# Patient Record
Sex: FEMALE | Race: WHITE | NOT HISPANIC OR LATINO | URBAN - METROPOLITAN AREA
[De-identification: names, ages, dates, MRNs, and addresses within clinical notes are randomized per-mention and may not be internally consistent; named-entity substitution may affect disease eponyms.]

---

## 2021-07-20 ENCOUNTER — OUTPATIENT (OUTPATIENT)
Dept: OUTPATIENT SERVICES | Facility: HOSPITAL | Age: 81
LOS: 1 days | End: 2021-07-20

## 2022-02-11 ENCOUNTER — TRANSCRIPTION ENCOUNTER (OUTPATIENT)
Age: 82
End: 2022-02-11

## 2022-06-23 ENCOUNTER — EMERGENCY (EMERGENCY)
Facility: HOSPITAL | Age: 82
LOS: 1 days | Discharge: HOME | End: 2022-06-23
Admitting: EMERGENCY MEDICINE
Payer: MEDICARE
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
## 2022-06-23 DIAGNOSIS — M54.50 LOW BACK PAIN, UNSPECIFIED: ICD-10-CM
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
## 2022-06-23 DIAGNOSIS — I10 ESSENTIAL (PRIMARY) HYPERTENSION: ICD-10-CM

## 2022-06-23 PROCEDURE — 72192 CT PELVIS W/O DYE: CPT | Mod: 26,MA

## 2022-06-23 PROCEDURE — 99284 EMERGENCY DEPT VISIT MOD MDM: CPT | Mod: FS

## 2022-06-23 PROCEDURE — 72131 CT LUMBAR SPINE W/O DYE: CPT | Mod: 26,MA

## 2022-07-19 ENCOUNTER — APPOINTMENT (OUTPATIENT)
Dept: RADIOLOGY | Facility: CLINIC | Age: 82
End: 2022-07-19

## 2022-07-19 PROCEDURE — 71046 X-RAY EXAM CHEST 2 VIEWS: CPT

## 2023-01-23 ENCOUNTER — APPOINTMENT (OUTPATIENT)
Dept: ULTRASOUND IMAGING | Facility: CLINIC | Age: 83
End: 2023-01-23
Payer: MEDICARE

## 2023-01-23 ENCOUNTER — APPOINTMENT (OUTPATIENT)
Dept: ULTRASOUND IMAGING | Facility: CLINIC | Age: 83
End: 2023-01-23

## 2023-01-23 PROBLEM — Z00.00 ENCOUNTER FOR PREVENTIVE HEALTH EXAMINATION: Status: ACTIVE | Noted: 2023-01-23

## 2023-01-23 PROCEDURE — 76700 US EXAM ABDOM COMPLETE: CPT

## 2023-01-31 ENCOUNTER — TRANSCRIPTION ENCOUNTER (OUTPATIENT)
Age: 83
End: 2023-01-31

## 2023-01-31 ENCOUNTER — APPOINTMENT (OUTPATIENT)
Dept: CT IMAGING | Facility: CLINIC | Age: 83
End: 2023-01-31
Payer: MEDICARE

## 2023-01-31 PROCEDURE — 74178 CT ABD&PLV WO CNTR FLWD CNTR: CPT

## 2023-02-07 ENCOUNTER — NON-APPOINTMENT (OUTPATIENT)
Age: 83
End: 2023-02-07

## 2023-02-07 ENCOUNTER — APPOINTMENT (OUTPATIENT)
Dept: UROLOGY | Facility: CLINIC | Age: 83
End: 2023-02-07
Payer: MEDICARE

## 2023-02-07 VITALS
WEIGHT: 170 LBS | HEIGHT: 67 IN | BODY MASS INDEX: 26.68 KG/M2 | TEMPERATURE: 97.3 F | HEART RATE: 65 BPM | SYSTOLIC BLOOD PRESSURE: 131 MMHG | DIASTOLIC BLOOD PRESSURE: 69 MMHG

## 2023-02-07 PROCEDURE — 99203 OFFICE O/P NEW LOW 30 MIN: CPT

## 2023-02-07 RX ORDER — UBIDECARENONE/VIT E ACET 100MG-5
CAPSULE ORAL
Refills: 0 | Status: ACTIVE | COMMUNITY

## 2023-02-07 RX ORDER — METOPROLOL SUCCINATE 25 MG/1
25 TABLET, EXTENDED RELEASE ORAL
Refills: 0 | Status: ACTIVE | COMMUNITY

## 2023-02-07 NOTE — ASSESSMENT
[FreeTextEntry1] : Right ureteral stone:\par start tamsulosin daily. Pt made aware of side effects of dizziness, nasal congestion. \par hydration\par strain urine\par f/u in 3 weeks with CT

## 2023-02-07 NOTE — PHYSICAL EXAM
[General Appearance - Well Developed] : well developed [General Appearance - Well Nourished] : well nourished [Normal Appearance] : normal appearance [Well Groomed] : well groomed [General Appearance - In No Acute Distress] : no acute distress [Edema] : no peripheral edema [Respiration, Rhythm And Depth] : normal respiratory rhythm and effort [Exaggerated Use Of Accessory Muscles For Inspiration] : no accessory muscle use [Abdomen Soft] : soft [Costovertebral Angle Tenderness] : no ~M costovertebral angle tenderness [Normal Station and Gait] : the gait and station were normal for the patient's age [] : no rash [No Focal Deficits] : no focal deficits [Oriented To Time, Place, And Person] : oriented to person, place, and time [Affect] : the affect was normal [Mood] : the mood was normal [Not Anxious] : not anxious

## 2023-02-07 NOTE — HISTORY OF PRESENT ILLNESS
[Urinary Incontinence] : urinary incontinence [Urinary Urgency] : urinary urgency [Urinary Frequency] : urinary frequency [Nocturia] : nocturia [Abdominal Pain] : abdominal pain [FreeTextEntry1] : 83 y/o F w/ PMH of HTN, HLD, Pre-diabetic, presents to the office for evaluation of a right sided ureteral stone. Pt initially had RUQ abdominal pain around new years isis. Since then patient describes discomfort of her right lower back & RUQ that comes and goes. Takes advil for the pain. \par Pt had a CT of her abdomen 2/2/2023, which showed a 5 x 9 mm proximal ureteral stone w/ hydronephrosis. No history of kidney stones. Pt increased fluid intake recently drinking 2-3 16oz bottled water daily. With this fluid increase pt has become more incontinent, using adult diapers. Urinates every few hours during the day and once at night.  Pt urinates when she stands up and when coughing. Pt denies any flank pain, discharge, hematuria, retention, N/V/D, fevers, and denies coffee intake. Pt is hesitant to start new medications as she worries about side effects.  [Urinary Retention] : no urinary retention [Straining] : no straining [Weak Stream] : no weak stream [Intermittency] : no intermittency [Post-Void Dribbling] : no post-void dribbling [Dysuria] : no dysuria [Hematuria - Gross] : no gross hematuria [Flank Pain] : no flank pain [Fever] : no fever [Fatigue] : no fatigue [Nausea] : no nausea [Anorexia] : no anorexia

## 2023-02-13 ENCOUNTER — APPOINTMENT (OUTPATIENT)
Dept: UROLOGY | Facility: CLINIC | Age: 83
End: 2023-02-13

## 2023-02-28 ENCOUNTER — APPOINTMENT (OUTPATIENT)
Dept: UROLOGY | Facility: CLINIC | Age: 83
End: 2023-02-28

## 2023-03-01 ENCOUNTER — APPOINTMENT (OUTPATIENT)
Dept: CT IMAGING | Facility: CLINIC | Age: 83
End: 2023-03-01
Payer: MEDICARE

## 2023-03-01 PROCEDURE — 74176 CT ABD & PELVIS W/O CONTRAST: CPT | Mod: MH

## 2023-03-08 ENCOUNTER — APPOINTMENT (OUTPATIENT)
Dept: UROLOGY | Facility: CLINIC | Age: 83
End: 2023-03-08
Payer: MEDICARE

## 2023-03-08 VITALS
HEART RATE: 71 BPM | BODY MASS INDEX: 26.68 KG/M2 | HEIGHT: 67 IN | TEMPERATURE: 96 F | WEIGHT: 170 LBS | SYSTOLIC BLOOD PRESSURE: 158 MMHG | DIASTOLIC BLOOD PRESSURE: 82 MMHG

## 2023-03-08 DIAGNOSIS — N13.30 UNSPECIFIED HYDRONEPHROSIS: ICD-10-CM

## 2023-03-08 PROCEDURE — 99214 OFFICE O/P EST MOD 30 MIN: CPT

## 2023-03-08 NOTE — HISTORY OF PRESENT ILLNESS
[FreeTextEntry1] : 82-year-old patient presented today to discuss the results of her recent CAT scan.  Patient was previously seen by Dr. Valerio for the right upper ureteral stone with hydronephrosis.\par She was started on medical treatment such as tamsulosin.  And was advised to return to the CAT scan.\par Your recent CAT scan was done on 1 March 2023.\par Patient told that she has less pain in the right lumbar area than previously

## 2023-03-08 NOTE — ASSESSMENT
[FreeTextEntry1] : I personally reviewed and compared CT scans that was done in January and the reason 1 that was done 7 days ago.  Patient has a impacted right upper ureteral stone that caused the hydronephrosis and proximal ureteral kinking.  The hydronephrosis became worse because of the measurements on CT scan from January the greatest diameter of the renal pelvis was 27 mm while recent CT showed 47 mm.\par We discussed with the patient that the fact that she has no pain may be and the injury is 1.  It could be explained by the decreased production of urine by the right kidney.\par The decision was made to send patient to the emergency room for admission and to measure and treatment.

## 2023-03-21 ENCOUNTER — APPOINTMENT (OUTPATIENT)
Dept: UROLOGY | Facility: CLINIC | Age: 83
End: 2023-03-21
Payer: MEDICARE

## 2023-03-21 VITALS
SYSTOLIC BLOOD PRESSURE: 159 MMHG | BODY MASS INDEX: 26.68 KG/M2 | TEMPERATURE: 97.3 F | HEART RATE: 89 BPM | WEIGHT: 170 LBS | HEIGHT: 67 IN | DIASTOLIC BLOOD PRESSURE: 81 MMHG

## 2023-03-21 DIAGNOSIS — N20.1 CALCULUS OF URETER: ICD-10-CM

## 2023-03-21 LAB
BILIRUB UR QL STRIP: NEGATIVE
CLARITY UR: CLEAR
COLLECTION METHOD: NORMAL
GLUCOSE UR-MCNC: NEGATIVE
HCG UR QL: 0.2 EU/DL
HGB UR QL STRIP.AUTO: NORMAL
KETONES UR-MCNC: NEGATIVE
LEUKOCYTE ESTERASE UR QL STRIP: NORMAL
NITRITE UR QL STRIP: NEGATIVE
PH UR STRIP: 5.5
PROT UR STRIP-MCNC: 100
SP GR UR STRIP: 1.03

## 2023-03-21 PROCEDURE — 52310 CYSTOSCOPY AND TREATMENT: CPT

## 2023-03-21 PROCEDURE — 81003 URINALYSIS AUTO W/O SCOPE: CPT | Mod: QW

## 2023-04-12 ENCOUNTER — APPOINTMENT (OUTPATIENT)
Dept: UROLOGY | Facility: CLINIC | Age: 83
End: 2023-04-12
Payer: MEDICARE

## 2023-04-12 VITALS
SYSTOLIC BLOOD PRESSURE: 123 MMHG | DIASTOLIC BLOOD PRESSURE: 68 MMHG | RESPIRATION RATE: 16 BRPM | HEIGHT: 67 IN | BODY MASS INDEX: 26.68 KG/M2 | TEMPERATURE: 96.7 F | HEART RATE: 64 BPM | WEIGHT: 170 LBS | OXYGEN SATURATION: 96 %

## 2023-04-12 DIAGNOSIS — N34.3 URETHRAL SYNDROME, UNSPECIFIED: ICD-10-CM

## 2023-04-12 PROCEDURE — 99213 OFFICE O/P EST LOW 20 MIN: CPT

## 2023-04-12 NOTE — HISTORY OF PRESENT ILLNESS
[FreeTextEntry1] : 82 year-old patient presented today for the follow-up. \par Nearly 3 weeks ago I did right ureteroscopy with laser lithotripsy for the right ureteral stones\par  with hydronephrosis.\par The stent from the right kidney was taken out in the office and Dr. Anton did it for me\par Patient feels well and has no lumbar pain.\par Her only concern is frequency and urgency with the urge incontinence

## 2023-04-12 NOTE — ASSESSMENT
[FreeTextEntry1] : Patient feels fine.  Her only concern is urgency and frequency for these we prescribed pills the trospium 20 mg twice a day.\par I also encourage patient to continue to drink a lot of water.\par I asked the follow-up ultrasound to be done 1 months from today\par We also sent the order for 24 hours collection of urine for the urinary results and citrate

## 2023-05-24 ENCOUNTER — APPOINTMENT (OUTPATIENT)
Dept: ULTRASOUND IMAGING | Facility: CLINIC | Age: 83
End: 2023-05-24
Payer: MEDICARE

## 2023-05-24 PROCEDURE — 76775 US EXAM ABDO BACK WALL LIM: CPT

## 2023-05-31 ENCOUNTER — APPOINTMENT (OUTPATIENT)
Dept: UROLOGY | Facility: CLINIC | Age: 83
End: 2023-05-31
Payer: MEDICARE

## 2023-05-31 VITALS
BODY MASS INDEX: 26.68 KG/M2 | HEIGHT: 67 IN | HEART RATE: 67 BPM | WEIGHT: 170 LBS | DIASTOLIC BLOOD PRESSURE: 73 MMHG | SYSTOLIC BLOOD PRESSURE: 131 MMHG | TEMPERATURE: 97.3 F

## 2023-05-31 DIAGNOSIS — Z87.442 PERSONAL HISTORY OF URINARY CALCULI: ICD-10-CM

## 2023-05-31 DIAGNOSIS — N39.3 STRESS INCONTINENCE (FEMALE) (MALE): ICD-10-CM

## 2023-05-31 PROCEDURE — 99214 OFFICE O/P EST MOD 30 MIN: CPT

## 2023-05-31 NOTE — HISTORY OF PRESENT ILLNESS
[FreeTextEntry1] : 83 year-old patient presented today for the follow-up. \par Nearly 2 months ago ago I did right ureteroscopy with laser lithotripsy for the right ureteral stones\par  with hydronephrosis.\par The stent from the right kidney was taken out in the office and Dr. Anton did it for me\par Patient feels well and has no lumbar pain.\par We ask to return to the ultrasound to exclude the residual hydronephrosis and also we asked patient to do the 24 hours collection of urine.\par Patient did the ultrasound but unfortunately failed to collect the urine because of severe incontinence

## 2023-05-31 NOTE — ASSESSMENT
[FreeTextEntry1] : Patient feels fine.  Her only concern is urgency and frequency.    for these we prescribed pills the trospium 20 mg twice a day.  However, her insurance company did not approve medication and from the list of medications that the insurance company can afford to her I could find only oxybutynin\par The ultrasound showed no hydronephrosis and no stones.\par I encourage patient to continue with the high water consumption, apple vinegar and lemonade without sugar\par I also asked patient to see Dr. Askew

## 2023-06-26 ENCOUNTER — APPOINTMENT (OUTPATIENT)
Dept: UROGYNECOLOGY | Facility: CLINIC | Age: 83
End: 2023-06-26

## 2023-11-14 ENCOUNTER — OFFICE (OUTPATIENT)
Dept: URBAN - METROPOLITAN AREA CLINIC 103 | Facility: CLINIC | Age: 83
Setting detail: OPHTHALMOLOGY
End: 2023-11-14
Payer: MEDICARE

## 2023-11-14 DIAGNOSIS — H35.033: ICD-10-CM

## 2023-11-14 DIAGNOSIS — G43.101: ICD-10-CM

## 2023-11-14 DIAGNOSIS — H35.3131: ICD-10-CM

## 2023-11-14 DIAGNOSIS — H02.834: ICD-10-CM

## 2023-11-14 DIAGNOSIS — H47.013: ICD-10-CM

## 2023-11-14 DIAGNOSIS — H16.223: ICD-10-CM

## 2023-11-14 DIAGNOSIS — M31.6: ICD-10-CM

## 2023-11-14 DIAGNOSIS — H02.831: ICD-10-CM

## 2023-11-14 PROCEDURE — 92250 FUNDUS PHOTOGRAPHY W/I&R: CPT | Performed by: OPHTHALMOLOGY

## 2023-11-14 PROCEDURE — 92014 COMPRE OPH EXAM EST PT 1/>: CPT | Performed by: OPHTHALMOLOGY

## 2023-11-14 ASSESSMENT — REFRACTION_CURRENTRX
OD_OVR_VA: 20/
OD_SPHERE: -0.50
OD_CYLINDER: -0.75
OS_SPHERE: -1.00
OD_OVR_VA: 20/
OD_VPRISM_DIRECTION: SV
OS_SPHERE: +2.75
OD_SPHERE: +2.75
OS_OVR_VA: 20/
OS_OVR_VA: 20/
OS_CYLINDER: -0.50
OD_AXIS: 142
OS_VPRISM_DIRECTION: SV
OS_AXIS: 091

## 2023-11-14 ASSESSMENT — REFRACTION_MANIFEST
OD_ADD: +2.50
OD_SPHERE: -0.25
OD_CYLINDER: -0.75
OD_VA1: 20/25+2
OD_VA1: 20/25-2
OS_VA1: 20/20
OS_ADD: +2.50
OS_CYLINDER: SPH
OS_ADD: +2.50
OS_AXIS: 090
OD_CYLINDER: -0.50
OD_AXIS: 130
OD_AXIS: 140
OS_SPHERE: -1.00
OS_SPHERE: -1.00
OD_SPHERE: -0.50
OD_ADD: +2.50
OU_VA: 20/25-
OS_VA1: 20/25-2
OS_CYLINDER: -0.50

## 2023-11-14 ASSESSMENT — CONFRONTATIONAL VISUAL FIELD TEST (CVF)
OS_FINDINGS: FULL
OD_FINDINGS: FULL

## 2023-11-14 ASSESSMENT — REFRACTION_AUTOREFRACTION
OD_SPHERE: PLANO
OS_CYLINDER: -1.25
OS_AXIS: 089
OD_CYLINDER: -1.25
OD_AXIS: 104
OS_SPHERE: -0.50

## 2023-11-14 ASSESSMENT — LID POSITION - DERMATOCHALASIS
OD_DERMATOCHALASIS: 2+
OS_DERMATOCHALASIS: 2+

## 2023-11-14 ASSESSMENT — SPHEQUIV_DERIVED
OD_SPHEQUIV: -0.5
OS_SPHEQUIV: -1.125
OD_SPHEQUIV: -0.875
OS_SPHEQUIV: -1.25

## 2023-11-14 ASSESSMENT — SUPERFICIAL PUNCTATE KERATITIS (SPK)
OD_SPK: ABSENT
OS_SPK: ABSENT

## 2024-04-03 ENCOUNTER — APPOINTMENT (OUTPATIENT)
Dept: CARDIOLOGY | Facility: CLINIC | Age: 84
End: 2024-04-03
Payer: MEDICARE

## 2024-04-03 VITALS
HEIGHT: 65 IN | WEIGHT: 197 LBS | DIASTOLIC BLOOD PRESSURE: 70 MMHG | OXYGEN SATURATION: 96 % | BODY MASS INDEX: 32.82 KG/M2 | HEART RATE: 77 BPM | SYSTOLIC BLOOD PRESSURE: 132 MMHG

## 2024-04-03 PROCEDURE — 93000 ELECTROCARDIOGRAM COMPLETE: CPT

## 2024-04-03 PROCEDURE — 99204 OFFICE O/P NEW MOD 45 MIN: CPT

## 2024-04-03 PROCEDURE — G2211 COMPLEX E/M VISIT ADD ON: CPT

## 2024-04-03 RX ORDER — MIRABEGRON 25 MG/1
25 TABLET, FILM COATED, EXTENDED RELEASE ORAL
Qty: 90 | Refills: 1 | Status: DISCONTINUED | COMMUNITY
Start: 2023-04-12 | End: 2024-04-03

## 2024-04-03 RX ORDER — VIBEGRON 75 MG/1
75 TABLET, FILM COATED ORAL DAILY
Qty: 90 | Refills: 3 | Status: DISCONTINUED | COMMUNITY
Start: 2023-04-19 | End: 2024-04-03

## 2024-04-03 RX ORDER — TAMSULOSIN HYDROCHLORIDE 0.4 MG/1
0.4 CAPSULE ORAL
Qty: 14 | Refills: 1 | Status: DISCONTINUED | COMMUNITY
Start: 2023-02-07 | End: 2024-04-03

## 2024-04-03 RX ORDER — FUROSEMIDE 20 MG/1
20 TABLET ORAL
Refills: 0 | Status: DISCONTINUED | COMMUNITY
End: 2024-04-03

## 2024-04-03 RX ORDER — TROSPIUM CHLORIDE 20 MG/1
20 TABLET, FILM COATED ORAL
Qty: 180 | Refills: 0 | Status: DISCONTINUED | COMMUNITY
Start: 2023-04-12 | End: 2024-04-03

## 2024-04-12 NOTE — HISTORY OF PRESENT ILLNESS
[FreeTextEntry1] : 83-year-old female who is referred for the chief complaint of dyspnea on exertion. The patient has a past medical history remarkable for temporal arteritis. The patient was never a smoker but, has a significant second-hand smoking exposure. The patient denies PND, orthopnea, or peripheral edema. The patient has occasional right sided pain, at times exertional. She has no history of a myocardial infarction, no ACS, no CHF in the past. She denies palpitations, syncope, or near syncope. The patient has a history of hypertension, but no history of hyperlipidemia or diabetes mellitus. The patient uses an inhaler with some improvement. The patient is referred for further evaluation and care.    HDL 66 HbA1c 5.8 12 lead ECG (04/03/2024) - SR 77 bpm, non-specific ECG changes.

## 2024-04-12 NOTE — DISCUSSION/SUMMARY
[FreeTextEntry1] : Hypertension  Diastolic Heart Failure versus COPD Obtain a 2 D ECHO.  Obtain an NT pro BNP.  F/U after testing and consider CCTA. Aggressive BP control No history of AF  I spent 45 minutes with the patient including: 10 minutes preparing for the visit. 25 minutes face to face. 10 minutes on documentation and coordination of care.

## 2024-04-15 ENCOUNTER — APPOINTMENT (OUTPATIENT)
Dept: CARDIOLOGY | Facility: CLINIC | Age: 84
End: 2024-04-15
Payer: MEDICARE

## 2024-04-15 PROCEDURE — 93306 TTE W/DOPPLER COMPLETE: CPT

## 2024-04-17 ENCOUNTER — APPOINTMENT (OUTPATIENT)
Dept: CARDIOLOGY | Facility: CLINIC | Age: 84
End: 2024-04-17
Payer: MEDICARE

## 2024-04-17 VITALS
OXYGEN SATURATION: 94 % | SYSTOLIC BLOOD PRESSURE: 136 MMHG | DIASTOLIC BLOOD PRESSURE: 64 MMHG | BODY MASS INDEX: 30.79 KG/M2 | HEART RATE: 77 BPM | WEIGHT: 185 LBS

## 2024-04-17 VITALS — WEIGHT: 198 LBS | BODY MASS INDEX: 32.95 KG/M2

## 2024-04-17 DIAGNOSIS — I10 ESSENTIAL (PRIMARY) HYPERTENSION: ICD-10-CM

## 2024-04-17 DIAGNOSIS — Z78.9 OTHER SPECIFIED HEALTH STATUS: ICD-10-CM

## 2024-04-17 DIAGNOSIS — Z86.79 PERSONAL HISTORY OF OTHER DISEASES OF THE CIRCULATORY SYSTEM: ICD-10-CM

## 2024-04-17 PROCEDURE — G2211 COMPLEX E/M VISIT ADD ON: CPT

## 2024-04-17 PROCEDURE — 99214 OFFICE O/P EST MOD 30 MIN: CPT

## 2024-04-19 PROBLEM — I10 DIASTOLIC HYPERTENSION: Status: ACTIVE | Noted: 2024-04-03

## 2024-04-19 PROBLEM — Z86.79 HISTORY OF HYPERTENSION: Status: RESOLVED | Noted: 2023-02-07 | Resolved: 2024-04-19

## 2024-04-19 PROBLEM — Z78.9 NON-SMOKER: Status: ACTIVE | Noted: 2023-02-07

## 2024-04-24 RX ORDER — VERAPAMIL HYDROCHLORIDE 240 MG/1
240 CAPSULE, DELAYED RELEASE PELLETS ORAL
Refills: 0 | Status: DISCONTINUED | COMMUNITY
End: 2024-04-24

## 2024-05-03 RX ORDER — LOSARTAN POTASSIUM 50 MG/1
50 TABLET, FILM COATED ORAL DAILY
Qty: 180 | Refills: 3 | Status: DISCONTINUED | COMMUNITY
Start: 2024-04-24 | End: 2024-05-03

## 2024-05-21 ENCOUNTER — OFFICE (OUTPATIENT)
Dept: URBAN - METROPOLITAN AREA CLINIC 103 | Facility: CLINIC | Age: 84
Setting detail: OPHTHALMOLOGY
End: 2024-05-21
Payer: MEDICARE

## 2024-05-21 DIAGNOSIS — H47.013: ICD-10-CM

## 2024-05-21 DIAGNOSIS — H16.223: ICD-10-CM

## 2024-05-21 DIAGNOSIS — H35.033: ICD-10-CM

## 2024-05-21 DIAGNOSIS — H35.3131: ICD-10-CM

## 2024-05-21 DIAGNOSIS — H35.51: ICD-10-CM

## 2024-05-21 DIAGNOSIS — G43.101: ICD-10-CM

## 2024-05-21 DIAGNOSIS — H52.7: ICD-10-CM

## 2024-05-21 DIAGNOSIS — M31.6: ICD-10-CM

## 2024-05-21 PROCEDURE — 92014 COMPRE OPH EXAM EST PT 1/>: CPT | Performed by: OPHTHALMOLOGY

## 2024-05-21 PROCEDURE — 92015 DETERMINE REFRACTIVE STATE: CPT | Performed by: OPHTHALMOLOGY

## 2024-05-21 PROCEDURE — 92134 CPTRZ OPH DX IMG PST SGM RTA: CPT | Performed by: OPHTHALMOLOGY

## 2024-05-21 ASSESSMENT — CONFRONTATIONAL VISUAL FIELD TEST (CVF)
OD_FINDINGS: FULL
OS_FINDINGS: FULL

## 2024-05-21 ASSESSMENT — LID POSITION - DERMATOCHALASIS
OS_DERMATOCHALASIS: 2+
OD_DERMATOCHALASIS: 2+